# Patient Record
Sex: MALE | Race: WHITE | NOT HISPANIC OR LATINO | URBAN - METROPOLITAN AREA
[De-identification: names, ages, dates, MRNs, and addresses within clinical notes are randomized per-mention and may not be internally consistent; named-entity substitution may affect disease eponyms.]

---

## 2021-01-06 ENCOUNTER — AMBULATORY SURGICAL CENTER (OUTPATIENT)
Dept: URBAN - METROPOLITAN AREA SURGERY 25 | Facility: SURGERY | Age: 21
End: 2021-01-06

## 2021-01-06 ENCOUNTER — OFFICE (OUTPATIENT)
Dept: URBAN - METROPOLITAN AREA CLINIC 106 | Facility: CLINIC | Age: 21
End: 2021-01-06

## 2021-01-06 DIAGNOSIS — K92.1 MELENA: ICD-10-CM

## 2021-01-06 DIAGNOSIS — K63.89 OTHER SPECIFIED DISEASES OF INTESTINE: ICD-10-CM

## 2021-01-06 DIAGNOSIS — K51.00 ULCERATIVE (CHRONIC) PANCOLITIS WITHOUT COMPLICATIONS: ICD-10-CM

## 2021-01-06 PROCEDURE — 88305 TISSUE EXAM BY PATHOLOGIST: CPT

## 2021-01-06 PROCEDURE — 45380 COLONOSCOPY AND BIOPSY: CPT | Performed by: INTERNAL MEDICINE

## 2022-07-05 ENCOUNTER — OFFICE (OUTPATIENT)
Dept: URBAN - METROPOLITAN AREA CLINIC 84 | Facility: CLINIC | Age: 22
End: 2022-07-05
Payer: SELF-PAY

## 2022-07-05 VITALS
SYSTOLIC BLOOD PRESSURE: 132 MMHG | WEIGHT: 118 LBS | DIASTOLIC BLOOD PRESSURE: 76 MMHG | HEART RATE: 124 BPM | HEIGHT: 67 IN

## 2022-07-05 DIAGNOSIS — K92.1 MELENA: ICD-10-CM

## 2022-07-05 DIAGNOSIS — R63.4 ABNORMAL WEIGHT LOSS: ICD-10-CM

## 2022-07-05 DIAGNOSIS — R19.7 DIARRHEA, UNSPECIFIED: ICD-10-CM

## 2022-07-05 DIAGNOSIS — R10.30 LOWER ABDOMINAL PAIN, UNSPECIFIED: ICD-10-CM

## 2022-07-05 PROCEDURE — 99214 OFFICE O/P EST MOD 30 MIN: CPT | Performed by: NURSE PRACTITIONER

## 2022-07-05 RX ORDER — PREDNISONE 10 MG/1
TABLET ORAL
Qty: 112 | Refills: 1 | Status: ACTIVE
Start: 2022-07-05

## 2022-07-05 RX ORDER — MESALAMINE 1000 MG/1
SUPPOSITORY RECTAL
Qty: 30 | Refills: 2 | Status: COMPLETED
Start: 2021-01-06 | End: 2022-07-19

## 2022-07-19 ENCOUNTER — OFFICE (OUTPATIENT)
Dept: URBAN - METROPOLITAN AREA CLINIC 84 | Facility: CLINIC | Age: 22
End: 2022-07-19
Payer: SELF-PAY

## 2022-07-19 VITALS
HEART RATE: 64 BPM | DIASTOLIC BLOOD PRESSURE: 72 MMHG | HEIGHT: 67 IN | WEIGHT: 115 LBS | SYSTOLIC BLOOD PRESSURE: 110 MMHG

## 2022-07-19 DIAGNOSIS — R79.82 ELEVATED C-REACTIVE PROTEIN (CRP): ICD-10-CM

## 2022-07-19 DIAGNOSIS — K51.20 ULCERATIVE (CHRONIC) PROCTITIS WITHOUT COMPLICATIONS: ICD-10-CM

## 2022-07-19 DIAGNOSIS — R19.7 DIARRHEA, UNSPECIFIED: ICD-10-CM

## 2022-07-19 DIAGNOSIS — R19.5 OTHER FECAL ABNORMALITIES: ICD-10-CM

## 2022-07-19 DIAGNOSIS — D72.829 ELEVATED WHITE BLOOD CELL COUNT, UNSPECIFIED: ICD-10-CM

## 2022-07-19 DIAGNOSIS — K92.1 MELENA: ICD-10-CM

## 2022-07-19 PROCEDURE — 99214 OFFICE O/P EST MOD 30 MIN: CPT | Performed by: NURSE PRACTITIONER

## 2022-08-09 ENCOUNTER — OFFICE (OUTPATIENT)
Dept: URBAN - METROPOLITAN AREA CLINIC 84 | Facility: CLINIC | Age: 22
End: 2022-08-09
Payer: SELF-PAY

## 2022-08-09 VITALS
SYSTOLIC BLOOD PRESSURE: 134 MMHG | WEIGHT: 124 LBS | HEART RATE: 96 BPM | HEIGHT: 67 IN | DIASTOLIC BLOOD PRESSURE: 82 MMHG

## 2022-08-09 DIAGNOSIS — K51.90 ULCERATIVE COLITIS, UNSPECIFIED, WITHOUT COMPLICATIONS: ICD-10-CM

## 2022-08-09 DIAGNOSIS — R93.5 ABNORMAL FINDINGS ON DIAGNOSTIC IMAGING OF OTHER ABDOMINAL R: ICD-10-CM

## 2022-08-09 DIAGNOSIS — R79.82 ELEVATED C-REACTIVE PROTEIN (CRP): ICD-10-CM

## 2022-08-09 DIAGNOSIS — D50.9 IRON DEFICIENCY ANEMIA, UNSPECIFIED: ICD-10-CM

## 2022-08-09 DIAGNOSIS — D72.829 ELEVATED WHITE BLOOD CELL COUNT, UNSPECIFIED: ICD-10-CM

## 2022-08-09 DIAGNOSIS — R19.5 OTHER FECAL ABNORMALITIES: ICD-10-CM

## 2022-08-09 PROCEDURE — 99214 OFFICE O/P EST MOD 30 MIN: CPT | Performed by: NURSE PRACTITIONER

## 2023-06-21 ENCOUNTER — OFFICE (OUTPATIENT)
Dept: URBAN - METROPOLITAN AREA CLINIC 84 | Facility: CLINIC | Age: 23
End: 2023-06-21

## 2023-06-21 VITALS
OXYGEN SATURATION: 97 % | HEIGHT: 67 IN | SYSTOLIC BLOOD PRESSURE: 108 MMHG | HEART RATE: 101 BPM | DIASTOLIC BLOOD PRESSURE: 67 MMHG | WEIGHT: 140 LBS

## 2023-06-21 DIAGNOSIS — R19.7 DIARRHEA, UNSPECIFIED: ICD-10-CM

## 2023-06-21 DIAGNOSIS — D50.9 IRON DEFICIENCY ANEMIA, UNSPECIFIED: ICD-10-CM

## 2023-06-21 DIAGNOSIS — K92.1 MELENA: ICD-10-CM

## 2023-06-21 DIAGNOSIS — K51.90 ULCERATIVE COLITIS, UNSPECIFIED, WITHOUT COMPLICATIONS: ICD-10-CM

## 2023-06-21 DIAGNOSIS — R10.30 LOWER ABDOMINAL PAIN, UNSPECIFIED: ICD-10-CM

## 2023-06-21 DIAGNOSIS — R79.82 ELEVATED C-REACTIVE PROTEIN (CRP): ICD-10-CM

## 2023-06-21 PROCEDURE — 99214 OFFICE O/P EST MOD 30 MIN: CPT | Performed by: NURSE PRACTITIONER

## 2023-06-21 NOTE — SERVICEHPINOTES
Bolivar Cerda   is seen today for a follow-up visit.    
br
sakshi He was seen 7/5/2022brhe had a colonoscopy 1/2021 with Dr. 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 He was treated with Canasa suppositories and he improved she was given Rowasa enemas but did not get this filled. He took the suppositories for 30 days and his symptoms resolved. He flared again 6/2021 with rectal bleeding and again took 30 days of the Canasa suppositories and his symptoms resolved.brAbout 3-4 weeks ago he was preparing for a trip to [facility] and started having some bad gas. On the plane on the way to [facility] the diarrhea started. He's had persistent diarrhea since that time up to 15 bowel movements a day with urgency and nocturnal bowel movements. He is having blood with each bowel movement worse in the last 2 days. He is having periumbilical and lower abdominal pain. Reports a 12-15 pound weight loss. He did go to the ER and [facility] 3 days ago and was given IV fluids and mesalamine suppository.brPlanbr- CMP (Complete Metabolic Panel)br- CBC w/auto diffbr- CRP (C-Reactive Protein)br- GI Panel, STOOLbr- Canasabr- Prednisonebr- Diagnosis and Management Options discussed.br- Reviewed prior endoscopiesbr- Reviewed pathologyInterval history, 7/19/2022brstool was positive for C. difficile. He was treated with vancomycin 125 mg 4 times a day for 10 days. He was initially started on prednisone 30 mg at the time of his last visit before his stool tests or known. I tapered him off and he completed that yesterday. he is not much better. His having increasing lower abdominal pain severe in the evenings. He has 3-4 bowel movements during the day before to 12 bowel movements at night with blood at times. He has some nausea and vomiting. His possible low-grade fever. We recommended him to go to the emergency room last week but he did not. His mom took him to the family practice and gotten vitamin IV infusion . His weight is down 3 pounds since his last visit.brPlanbr- repeat stoolbr- CT Abdomen and pelvisbr- CBC, iron, ferritin, CMPinterval history, 8/9/2022brRepeat stool was negative for C. difficile. CT revealed pancolitis and nonspecific jejunitis. IBD panel suggestive of IBD. He has been on a prednisone taper and he is known to down to 20 mg once a day.. he is having 3 bowel movements a day when nocturnally. He does have flatulence which is aggravating and some bloating. He has gained 9 pounds. The little bit of soreness in his upper abdomen but denies nausea vomiting. He is taking a probiotic daily and iron twice a day.brPlanbr- Discussed colonoscopybr- suspect now chronic pancolits or possible Crohn's. Discussed treatment with anti-TNF therapy. He and his mother do not wish any current treatment right now. He wishes to complete his prednisone and focus on diet changes and probiotics. He does agree to a colonoscopy in a few months. He will let us know if his symptoms worsen. Discussed untreated ulcerative colitis and Crohn's could lead to chronic inflammation stricturing in need for surgery in the future.br- repeat labsinterval history, 6/21/2023
br he has been on supplements since have seen him.  He has gained 16 pounds.  He has been doing well and feeling fine.  He usually has 2-3 solid bowel movements daily with no blood or mucus or abdominal pain..  He went on a trip to [facility] and [place].  4-5 days before the end of the trip he developed diarrhea.  He states one third of the group also got diarrhea.  He returned on 5/29/2023.  His diarrhea continues from 4-5 bowel movements a day up to 8-9 bowel movements a day with occasional blood or mucus.  He was given an antibiotic for 2-3 days while he was in [place].  He's had 2 food reactions had some tongue swelling since this time.  He also saw his PCP and was having thrush and was given nystatin..  He is leaving for another trip out of the country 7/15/2023.  he called last week and we gave him Canasa suppositories which he  is doing nightly.Labsbr7/2022–TB not completed, hepatitis B surface antigen, antibody and core antibody total–negative, hep C negativebr7/2022–IBD expanded panel suggesting IBD not conclusive of Crohn's versus colitisbr7/19/2022- WBC 11, hemoglobin 11, platelets 6:15, iron 19, 11% saturation, ferritin 211, stool negative C. difficilebr7/5/2022–WBC 15, hemoglobin 13, platelet 447, CMP normal except albumin 3.3, , stool panel positive for C. difficileimagingbr7/2022–CT abdomen and pelvis contrast — nonspecific jejunitis, , pancolitis with proctitisProceduresbr1/2021–colonoscopy–ulcerative proctitis 0-12 cmbr visited="true"

## 2023-06-22 LAB
C-REACTIVE PROTEIN, QUANT: 13 MG/L — HIGH (ref 0–10)
CBC WITH DIFFERENTIAL/PLATELET: BASO (ABSOLUTE): 0.1 X10E3/UL (ref 0–0.2)
CBC WITH DIFFERENTIAL/PLATELET: BASOS: 0 %
CBC WITH DIFFERENTIAL/PLATELET: EOS (ABSOLUTE): 1.2 X10E3/UL — HIGH (ref 0–0.4)
CBC WITH DIFFERENTIAL/PLATELET: EOS: 10 %
CBC WITH DIFFERENTIAL/PLATELET: HEMATOCRIT: 42.7 % (ref 37.5–51)
CBC WITH DIFFERENTIAL/PLATELET: HEMATOLOGY COMMENTS: (no result)
CBC WITH DIFFERENTIAL/PLATELET: HEMOGLOBIN: 14.4 G/DL (ref 13–17.7)
CBC WITH DIFFERENTIAL/PLATELET: IMMATURE CELLS: (no result)
CBC WITH DIFFERENTIAL/PLATELET: IMMATURE GRANS (ABS): 0 X10E3/UL (ref 0–0.1)
CBC WITH DIFFERENTIAL/PLATELET: IMMATURE GRANULOCYTES: 0 %
CBC WITH DIFFERENTIAL/PLATELET: LYMPHS (ABSOLUTE): 1.4 X10E3/UL (ref 0.7–3.1)
CBC WITH DIFFERENTIAL/PLATELET: LYMPHS: 12 %
CBC WITH DIFFERENTIAL/PLATELET: MCH: 31.4 PG (ref 26.6–33)
CBC WITH DIFFERENTIAL/PLATELET: MCHC: 33.7 G/DL (ref 31.5–35.7)
CBC WITH DIFFERENTIAL/PLATELET: MCV: 93 FL (ref 79–97)
CBC WITH DIFFERENTIAL/PLATELET: MONOCYTES(ABSOLUTE): 0.7 X10E3/UL (ref 0.1–0.9)
CBC WITH DIFFERENTIAL/PLATELET: MONOCYTES: 6 %
CBC WITH DIFFERENTIAL/PLATELET: NEUTROPHILS (ABSOLUTE): 8 X10E3/UL — HIGH (ref 1.4–7)
CBC WITH DIFFERENTIAL/PLATELET: NEUTROPHILS: 72 %
CBC WITH DIFFERENTIAL/PLATELET: NRBC: (no result)
CBC WITH DIFFERENTIAL/PLATELET: PLATELETS: 437 X10E3/UL (ref 150–450)
CBC WITH DIFFERENTIAL/PLATELET: RBC: 4.59 X10E6/UL (ref 4.14–5.8)
CBC WITH DIFFERENTIAL/PLATELET: RDW: 13.2 % (ref 11.6–15.4)
CBC WITH DIFFERENTIAL/PLATELET: WBC: 11.3 X10E3/UL — HIGH (ref 3.4–10.8)
COMP. METABOLIC PANEL (14): A/G RATIO: 1.9 (ref 1.2–2.2)
COMP. METABOLIC PANEL (14): ALBUMIN: 4.8 G/DL (ref 4.1–5.2)
COMP. METABOLIC PANEL (14): ALKALINE PHOSPHATASE: 76 IU/L (ref 44–121)
COMP. METABOLIC PANEL (14): ALT (SGPT): 15 IU/L (ref 0–44)
COMP. METABOLIC PANEL (14): AST (SGOT): 16 IU/L (ref 0–40)
COMP. METABOLIC PANEL (14): BILIRUBIN, TOTAL: <0.2 MG/DL
COMP. METABOLIC PANEL (14): BUN/CREATININE RATIO: 17 (ref 9–20)
COMP. METABOLIC PANEL (14): BUN: 16 MG/DL (ref 6–20)
COMP. METABOLIC PANEL (14): CALCIUM: 9.9 MG/DL (ref 8.7–10.2)
COMP. METABOLIC PANEL (14): CARBON DIOXIDE, TOTAL: 23 MMOL/L (ref 20–29)
COMP. METABOLIC PANEL (14): CHLORIDE: 99 MMOL/L (ref 96–106)
COMP. METABOLIC PANEL (14): CREATININE: 0.92 MG/DL (ref 0.76–1.27)
COMP. METABOLIC PANEL (14): EGFR: 121 ML/MIN/1.73 (ref 59–?)
COMP. METABOLIC PANEL (14): GLOBULIN, TOTAL: 2.5 G/DL (ref 1.5–4.5)
COMP. METABOLIC PANEL (14): GLUCOSE: 79 MG/DL (ref 70–99)
COMP. METABOLIC PANEL (14): POTASSIUM: 5.1 MMOL/L (ref 3.5–5.2)
COMP. METABOLIC PANEL (14): PROTEIN, TOTAL: 7.3 G/DL (ref 6–8.5)
COMP. METABOLIC PANEL (14): SODIUM: 137 MMOL/L (ref 134–144)
FERRITIN: 103 NG/ML (ref 30–400)
IRON AND TIBC: IRON BIND.CAP.(TIBC): 264 UG/DL (ref 250–450)
IRON AND TIBC: IRON SATURATION: 22 % (ref 15–55)
IRON AND TIBC: IRON: 58 UG/DL (ref 38–169)
IRON AND TIBC: UIBC: 206 UG/DL (ref 111–343)
VITAMIN B12: 1759 PG/ML — HIGH (ref 232–1245)